# Patient Record
Sex: MALE | Race: WHITE | Employment: OTHER | URBAN - METROPOLITAN AREA
[De-identification: names, ages, dates, MRNs, and addresses within clinical notes are randomized per-mention and may not be internally consistent; named-entity substitution may affect disease eponyms.]

---

## 2017-03-24 PROBLEM — S83.281D TEAR OF LATERAL MENISCUS OF RIGHT KNEE, CURRENT, UNSPECIFIED TEAR TYPE, SUBSEQUENT ENCOUNTER: Status: ACTIVE | Noted: 2017-03-24

## 2017-03-24 PROBLEM — M22.41 CHONDROMALACIA PATELLAE, RIGHT: Status: ACTIVE | Noted: 2017-03-24

## 2017-03-24 PROBLEM — S83.241D TEAR OF MEDIAL MENISCUS OF RIGHT KNEE, CURRENT, UNSPECIFIED TEAR TYPE, SUBSEQUENT ENCOUNTER: Status: ACTIVE | Noted: 2017-03-24

## 2017-04-11 ENCOUNTER — EKG ENCOUNTER (OUTPATIENT)
Dept: LAB | Facility: HOSPITAL | Age: 72
End: 2017-04-11
Attending: ORTHOPAEDIC SURGERY
Payer: COMMERCIAL

## 2017-04-11 DIAGNOSIS — Z01.818 PRE-OP EXAM: Primary | ICD-10-CM

## 2017-04-11 DIAGNOSIS — Z01.812 PRE-OPERATIVE LABORATORY EXAMINATION: ICD-10-CM

## 2017-04-11 PROCEDURE — 36415 COLL VENOUS BLD VENIPUNCTURE: CPT

## 2017-04-11 PROCEDURE — 80053 COMPREHEN METABOLIC PANEL: CPT

## 2017-04-11 PROCEDURE — 85025 COMPLETE CBC W/AUTO DIFF WBC: CPT

## 2017-04-11 PROCEDURE — 93005 ELECTROCARDIOGRAM TRACING: CPT

## 2017-04-11 PROCEDURE — 93010 ELECTROCARDIOGRAM REPORT: CPT | Performed by: INTERNAL MEDICINE

## 2017-04-13 ENCOUNTER — HOSPITAL ENCOUNTER (OUTPATIENT)
Dept: LAB | Facility: HOSPITAL | Age: 72
Discharge: HOME | End: 2017-04-13
Attending: STUDENT IN AN ORGANIZED HEALTH CARE EDUCATION/TRAINING PROGRAM
Payer: COMMERCIAL

## 2017-04-13 DIAGNOSIS — R74.01 ELEVATED TRANSAMINASE LEVEL: ICD-10-CM

## 2017-04-13 PROCEDURE — 83550 IRON BINDING TEST: CPT | Performed by: STUDENT IN AN ORGANIZED HEALTH CARE EDUCATION/TRAINING PROGRAM

## 2017-04-13 PROCEDURE — 36415 COLL VENOUS BLD VENIPUNCTURE: CPT | Performed by: STUDENT IN AN ORGANIZED HEALTH CARE EDUCATION/TRAINING PROGRAM

## 2017-04-13 PROCEDURE — 85730 THROMBOPLASTIN TIME PARTIAL: CPT | Performed by: STUDENT IN AN ORGANIZED HEALTH CARE EDUCATION/TRAINING PROGRAM

## 2017-04-13 PROCEDURE — 82105 ALPHA-FETOPROTEIN SERUM: CPT | Performed by: STUDENT IN AN ORGANIZED HEALTH CARE EDUCATION/TRAINING PROGRAM

## 2017-04-13 PROCEDURE — 80074 ACUTE HEPATITIS PANEL: CPT | Performed by: STUDENT IN AN ORGANIZED HEALTH CARE EDUCATION/TRAINING PROGRAM

## 2017-04-13 PROCEDURE — 85610 PROTHROMBIN TIME: CPT | Performed by: STUDENT IN AN ORGANIZED HEALTH CARE EDUCATION/TRAINING PROGRAM

## 2017-04-13 PROCEDURE — 82728 ASSAY OF FERRITIN: CPT | Performed by: STUDENT IN AN ORGANIZED HEALTH CARE EDUCATION/TRAINING PROGRAM

## 2017-04-13 PROCEDURE — 83516 IMMUNOASSAY NONANTIBODY: CPT | Performed by: STUDENT IN AN ORGANIZED HEALTH CARE EDUCATION/TRAINING PROGRAM

## 2017-04-13 PROCEDURE — 84432 ASSAY OF THYROGLOBULIN: CPT | Performed by: STUDENT IN AN ORGANIZED HEALTH CARE EDUCATION/TRAINING PROGRAM

## 2017-04-13 PROCEDURE — 82550 ASSAY OF CK (CPK): CPT | Performed by: STUDENT IN AN ORGANIZED HEALTH CARE EDUCATION/TRAINING PROGRAM

## 2017-04-13 PROCEDURE — 83540 ASSAY OF IRON: CPT | Performed by: STUDENT IN AN ORGANIZED HEALTH CARE EDUCATION/TRAINING PROGRAM

## 2017-04-13 PROCEDURE — 80053 COMPREHEN METABOLIC PANEL: CPT | Performed by: STUDENT IN AN ORGANIZED HEALTH CARE EDUCATION/TRAINING PROGRAM

## 2017-04-13 PROCEDURE — 86038 ANTINUCLEAR ANTIBODIES: CPT | Performed by: STUDENT IN AN ORGANIZED HEALTH CARE EDUCATION/TRAINING PROGRAM

## 2017-04-13 PROCEDURE — 86708 HEPATITIS A ANTIBODY: CPT | Performed by: STUDENT IN AN ORGANIZED HEALTH CARE EDUCATION/TRAINING PROGRAM

## 2017-04-17 ENCOUNTER — HOSPITAL ENCOUNTER (OUTPATIENT)
Dept: ULTRASOUND IMAGING | Age: 72
Discharge: HOME | End: 2017-04-17
Attending: STUDENT IN AN ORGANIZED HEALTH CARE EDUCATION/TRAINING PROGRAM
Payer: COMMERCIAL

## 2017-04-17 DIAGNOSIS — R74.01 ELEVATED TRANSAMINASE LEVEL: ICD-10-CM

## 2017-04-17 PROCEDURE — 93975 VASCULAR STUDY: CPT

## 2017-04-17 PROCEDURE — 76700 US EXAM ABDOM COMPLETE: CPT

## 2017-04-19 ENCOUNTER — APPOINTMENT (OUTPATIENT)
Dept: LAB | Facility: HOSPITAL | Age: 72
End: 2017-04-19
Attending: STUDENT IN AN ORGANIZED HEALTH CARE EDUCATION/TRAINING PROGRAM
Payer: COMMERCIAL

## 2017-04-19 PROCEDURE — 80053 COMPREHEN METABOLIC PANEL: CPT | Performed by: STUDENT IN AN ORGANIZED HEALTH CARE EDUCATION/TRAINING PROGRAM

## 2017-04-19 PROCEDURE — 85610 PROTHROMBIN TIME: CPT | Performed by: STUDENT IN AN ORGANIZED HEALTH CARE EDUCATION/TRAINING PROGRAM

## 2017-04-21 ENCOUNTER — APPOINTMENT (OUTPATIENT)
Dept: LAB | Facility: HOSPITAL | Age: 72
End: 2017-04-21
Attending: STUDENT IN AN ORGANIZED HEALTH CARE EDUCATION/TRAINING PROGRAM
Payer: COMMERCIAL

## 2017-04-21 DIAGNOSIS — R74.01 NONSPECIFIC ELEVATION OF LEVELS OF TRANSAMINASE OR LACTIC ACID DEHYDROGENASE (LDH): ICD-10-CM

## 2017-04-21 DIAGNOSIS — R74.02 NONSPECIFIC ELEVATION OF LEVELS OF TRANSAMINASE OR LACTIC ACID DEHYDROGENASE (LDH): ICD-10-CM

## 2017-04-21 PROCEDURE — 85610 PROTHROMBIN TIME: CPT

## 2017-04-21 PROCEDURE — 36415 COLL VENOUS BLD VENIPUNCTURE: CPT

## 2017-04-21 PROCEDURE — 80053 COMPREHEN METABOLIC PANEL: CPT

## 2017-04-26 ENCOUNTER — APPOINTMENT (OUTPATIENT)
Dept: LAB | Facility: HOSPITAL | Age: 72
End: 2017-04-26
Attending: ORTHOPAEDIC SURGERY
Payer: COMMERCIAL

## 2017-04-26 DIAGNOSIS — Z01.812 PRE-OPERATIVE LABORATORY EXAMINATION: ICD-10-CM

## 2017-04-26 PROCEDURE — 84450 TRANSFERASE (AST) (SGOT): CPT

## 2017-04-26 PROCEDURE — 84460 ALANINE AMINO (ALT) (SGPT): CPT

## 2017-04-26 PROCEDURE — 36415 COLL VENOUS BLD VENIPUNCTURE: CPT

## 2017-05-03 ENCOUNTER — APPOINTMENT (OUTPATIENT)
Dept: LAB | Facility: HOSPITAL | Age: 72
End: 2017-05-03
Attending: ORTHOPAEDIC SURGERY
Payer: COMMERCIAL

## 2017-05-03 DIAGNOSIS — Z01.812 PRE-OPERATIVE LABORATORY EXAMINATION: ICD-10-CM

## 2017-05-03 PROCEDURE — 36415 COLL VENOUS BLD VENIPUNCTURE: CPT

## 2017-05-03 PROCEDURE — 80053 COMPREHEN METABOLIC PANEL: CPT

## 2017-05-04 PROBLEM — K71.9 DRUG-INDUCED LIVER INJURY: Status: ACTIVE | Noted: 2017-05-04

## 2017-05-10 ENCOUNTER — APPOINTMENT (OUTPATIENT)
Dept: LAB | Facility: HOSPITAL | Age: 72
End: 2017-05-10
Attending: STUDENT IN AN ORGANIZED HEALTH CARE EDUCATION/TRAINING PROGRAM
Payer: COMMERCIAL

## 2017-05-10 DIAGNOSIS — IMO0002 TRANSAMINASE OR LDH ELEVATION: ICD-10-CM

## 2017-05-10 PROCEDURE — 36415 COLL VENOUS BLD VENIPUNCTURE: CPT

## 2017-05-10 PROCEDURE — 80053 COMPREHEN METABOLIC PANEL: CPT

## 2017-06-02 PROBLEM — S83.281A ACUTE LATERAL MENISCUS TEAR OF RIGHT KNEE: Status: ACTIVE | Noted: 2017-06-02

## 2017-06-02 PROBLEM — M17.11 PRIMARY OSTEOARTHRITIS OF RIGHT KNEE: Status: ACTIVE | Noted: 2017-06-02

## 2017-06-02 PROBLEM — S83.241A ACUTE MEDIAL MENISCUS TEAR OF RIGHT KNEE: Status: ACTIVE | Noted: 2017-06-02

## 2021-03-31 ENCOUNTER — OFFICE VISIT (OUTPATIENT)
Dept: NEUROLOGY | Facility: CLINIC | Age: 76
End: 2021-03-31
Payer: MEDICARE

## 2021-03-31 VITALS
DIASTOLIC BLOOD PRESSURE: 72 MMHG | HEIGHT: 75 IN | HEART RATE: 65 BPM | BODY MASS INDEX: 33.69 KG/M2 | WEIGHT: 271 LBS | RESPIRATION RATE: 20 BRPM | SYSTOLIC BLOOD PRESSURE: 120 MMHG

## 2021-03-31 DIAGNOSIS — G25.0 ESSENTIAL TREMOR: Primary | ICD-10-CM

## 2021-03-31 PROCEDURE — 3008F BODY MASS INDEX DOCD: CPT | Performed by: OTHER

## 2021-03-31 PROCEDURE — 99204 OFFICE O/P NEW MOD 45 MIN: CPT | Performed by: OTHER

## 2021-03-31 PROCEDURE — 3078F DIAST BP <80 MM HG: CPT | Performed by: OTHER

## 2021-03-31 PROCEDURE — 3074F SYST BP LT 130 MM HG: CPT | Performed by: OTHER

## 2021-03-31 RX ORDER — ASCORBIC ACID 500 MG
TABLET ORAL
COMMUNITY

## 2021-03-31 NOTE — PROGRESS NOTES
[facility]   Neurology; INITIAL CLINIC VISIT  3/31/2021, 9:11 AM     Venkatesh Del Rio Patient Status:  No patient class for patient encounter    1945 MRN [id number]   Location [address] [facility] Attending No att.  pr of systems was completed. Pertinent positives and negatives noted in the the HPI.       PHYSICAL EXAMINATION:  /72   Pulse 65   Resp 20   Ht 75\"   Wt 271 lb (122.9 kg)   BMI 33.87 kg/m²   Looks stated age, PC AS  No LAD, no TM  Lungs CTA  Heart S1S2

## 2022-03-23 LAB
AMB EXT ANION GAP: 5
AMB EXT BUN: 17 MG/DL
AMB EXT CALCIUM: 8.8
AMB EXT CARBON DIOXIDE: 29
AMB EXT CHLORIDE: 29
AMB EXT CHOLESTEROL, TOTAL: 129 MG/DL
AMB EXT CMP ALT: 20 U/L
AMB EXT CMP AST: 19 U/L
AMB EXT CREATININE: 0.89 MG/DL
AMB EXT EGFR NON-AA: 88
AMB EXT GLUCOSE: 94 MG/DL
AMB EXT HDL CHOLESTEROL: 44 MG/DL
AMB EXT HGBA1C: 6.7 %
AMB EXT LDL CHOLESTEROL, DIRECT: 72 MG/DL
AMB EXT NON HDL CHOL: 85 MG/DL
AMB EXT POSTASSIUM: 4.4 MMOL/L
AMB EXT SODIUM: 1140 MMOL/L
AMB EXT TOTAL PROTEIN: 6.8
AMB EXT TRIGLYCERIDES: 65 MG/DL

## 2022-05-02 ENCOUNTER — HOSPITAL ENCOUNTER (OUTPATIENT)
Dept: ULTRASOUND IMAGING | Age: 77
Discharge: HOME | End: 2022-05-02
Attending: NURSE PRACTITIONER
Payer: MEDICARE

## 2022-05-02 DIAGNOSIS — Z13.6 SCREENING FOR CARDIOVASCULAR CONDITION: ICD-10-CM

## 2022-05-02 PROCEDURE — 76706 US ABDL AORTA SCREEN AAA: CPT | Performed by: NURSE PRACTITIONER

## 2022-05-06 ENCOUNTER — NURSE ONLY (OUTPATIENT)
Dept: ENDOCRINOLOGY CLINIC | Facility: CLINIC | Age: 77
End: 2022-05-06
Payer: MEDICARE

## 2022-05-06 ENCOUNTER — TELEPHONE (OUTPATIENT)
Dept: ENDOCRINOLOGY CLINIC | Facility: CLINIC | Age: 77
End: 2022-05-06

## 2022-05-06 VITALS — BODY MASS INDEX: 35 KG/M2 | WEIGHT: 280.63 LBS

## 2022-05-06 DIAGNOSIS — E11.9 DIABETES MELLITUS WITHOUT COMPLICATION (HCC): Primary | ICD-10-CM

## 2022-05-08 RX ORDER — BIOTIN 2500 MCG
2 CAPSULE ORAL DAILY
COMMUNITY

## 2022-05-23 ENCOUNTER — DIABETIC EDUCATION (OUTPATIENT)
Dept: ENDOCRINOLOGY CLINIC | Facility: CLINIC | Age: 77
End: 2022-05-23
Payer: MEDICARE

## 2022-05-23 DIAGNOSIS — E11.9 TYPE 2 DIABETES MELLITUS WITHOUT COMPLICATION, WITHOUT LONG-TERM CURRENT USE OF INSULIN (HCC): Primary | ICD-10-CM

## 2022-05-23 NOTE — PROGRESS NOTES
Francia Dotson   9/4/1945 was seen for Medical Nutrition Therapy with Diabetes:    5/23/2022  Referring Provider: Dr. Jen Christine  Start time: 3:30 End time: 4:30    HgbA1C (%)   Date Value   03/23/2022 6.7     Here with wife John Hodges. Is testing BG's at varying times of the day ranging . Weight down 5# in 2 weeks. Had gained weight in the past year. B: smoothie or a banana or a slice of toast and half glass tomato juice    L: 1/2 sandwich and 1/2 tomato and 1/2 cucumber    2-3pm apple, pear or orange or maybe yogurt    D: chicken, salad, vegetable, smaller portion of pasta than usual.     HS: maybe small portion of skinny pop popcorn    Beverages: mostly water, occasionally diet or regular soda or wine. One piece salted caramel or ice cream (smaller portion). Exercise: rowing machine 3 times/week from 20-30 minutes 400 strokes. Today he mowed two lawns. Reviewed HgbA1C and target A1c levels. Reviewed target BG levels FBS  and pp <150 ideally. Reviewed Type 2 diabetes as a diagnosis. Discussed insulin resistance and tools to treat: diet, regular physical activity, diabetes medications, and stress management. Also discussed natural progression of Type 2 diabetes and ways to slow progression: regular physical activity, good blood glucose control and avoid weight gain/possibly reduce weight. Taught label reading: focus on counting grams of carb rather than looking at sugar. Reviewed current diabetes medications: didn't tolerate metformin ER. Discussed Rule of 15 for treating hypoglycemia. Discussed macronutrient balance: reduce starch, include lean protein and non-starchy vegetables, also fruit, yogurt and milk using food models. Gave examples of heart healthy, balanced meals with 30-45 grams of carbohydrate/meal.     Reviewed principles for heart healthy diet: reduced saturated fat, reduced sodium and high fiber.     Exercise: Discussed benefits of regular physical activity and goal to complete 30 minutes daily. Patient set diabetes self-management goals: keep up the good work, maybe increase food intake slightly (although pt states he is not hungry). Patient is willing to make lifestyle changes to improve blood glucose control. Written materials provided for all topics covered. Patient verbalized understanding and has no further questions at this time. Thank you for the referral.  Follow-up plan: pt scheduled to attend classes starting in June on Thursdays. Patient to contact diabetes center for questions/concerns.   Roma Roy MS, RD, CDCES, LDN

## 2022-06-09 ENCOUNTER — NURSE ONLY (OUTPATIENT)
Dept: ENDOCRINOLOGY CLINIC | Facility: CLINIC | Age: 77
End: 2022-06-09
Payer: MEDICARE

## 2022-06-09 DIAGNOSIS — E11.9 TYPE 2 DIABETES MELLITUS WITHOUT COMPLICATION, WITHOUT LONG-TERM CURRENT USE OF INSULIN (HCC): Primary | ICD-10-CM

## 2022-06-09 NOTE — PROGRESS NOTES
Francia Dotson  [id number] attended Step 2 Group Class: Pathophysiology of Diabetes, Types of Diabetes, Sources of Carbohydrate, Blood Glucose Targets, Medications    Date: 6/9/2022  Referring Provider: Dr. Huan Bingham  Start time: 2pm End time: 3:30pm    The patient participated during the class: The patient was able to answer questions appropriately about treatments for type 2 diabetes. Healthy Eating   Identify sources of carbohydrate  Review technology options for assistance in determining carbohydrate and caloric content of foods     Being Active   Introduce the benefits and precautions of regular physical activity    Monitoring   Review blood glucose targets, Hemoglobin A1C   Introduce Continuous Glucose Monitoring Systems    Taking Medication   Discuss types of diabetes medications: methods of action, positive and negative aspects of each one, side effects including insulin     Problem Solving   Discuss risk factors for type 2 diabetes  Identify habits, myths and behavior changes affecting eating patterns and choices  Discuss how tracking calories and carbohydrate intake affects blood glucose and weigh reduction    Reducing Risks    Discuss progress with goals initiated in Step 1 individual session  Discuss signs/symptoms and treatment of hypoglycemia    Healthy Coping  During introductions, discuss how having diabetes and getting the diagnosis affects individuals with diabetes    The patient verbalized understanding and has no further questions at this time. Written materials provided for all areas covered.   Recommendation: attend Step 3 Class    Sherrell Novak RN, CDE

## 2022-06-16 ENCOUNTER — NURSE ONLY (OUTPATIENT)
Dept: ENDOCRINOLOGY CLINIC | Facility: CLINIC | Age: 77
End: 2022-06-16
Payer: MEDICARE

## 2022-06-16 DIAGNOSIS — E11.9 DIABETES MELLITUS WITHOUT COMPLICATION (HCC): Primary | ICD-10-CM

## 2022-06-16 PROCEDURE — G0109 DIAB MANAGE TRN IND/GROUP: HCPCS | Performed by: DIETITIAN, REGISTERED

## 2022-06-16 NOTE — PROGRESS NOTES
Denise Camp  [id number] attended Step 3 Group Class: Carbohydrate Counting, Treating Lows    Date: 6/16/2022  Referring Provider: Dr. Ely Luis  Start time: 2pmEnd time: 3:30pm    The patient participated during the class: Pt was able to identify sources of carbohydrates. Healthy Eating  Introduce the balanced plate  Learn to count carbohydrates as well as to differentiate the food groups  Discuss label reading  Discuss sugar substitutes and effects on blood glucose  Discuss alcohol and effects on blood glucose  Review technology options for assistance in determining carbohydrate and caloric content of foods    Being Active  Discuss benefits of exercise and common myths    Monitoring  Review blood glucose targets    Taking Medication  Discuss treatment options for type 2 diabetes, introduce progressive nature of type 2 diabetes   Reducing Risks  Discuss signs/symptoms and treatment of hypoglycemia    The patient verbalized understanding and has no further questions at this time  Written materials provided for all areas covered. Recommendation: attend individual MNT session and Step 4 Class  Patient verbalized understanding and has no further questions at this time.     Annette Arzate, RN, CDE

## 2022-06-30 ENCOUNTER — NURSE ONLY (OUTPATIENT)
Dept: ENDOCRINOLOGY CLINIC | Facility: CLINIC | Age: 77
End: 2022-06-30
Payer: MEDICARE

## 2022-06-30 DIAGNOSIS — E11.9 TYPE 2 DIABETES MELLITUS WITHOUT COMPLICATION, WITHOUT LONG-TERM CURRENT USE OF INSULIN (HCC): Primary | ICD-10-CM

## 2022-06-30 PROCEDURE — G0109 DIAB MANAGE TRN IND/GROUP: HCPCS | Performed by: DIETITIAN, REGISTERED

## 2022-06-30 NOTE — PROGRESS NOTES
Iveth Burton  [id number] attended Step 4 Group Class: Reducing Complications, Special Occasion Eating, Treating Hyperglycemia  Date: 6/30/2022  Referring Provider: Dr. Vijay Rust  Start time: 2pm End time: 3:30pm    The patient participated during the class: Patient was able to identify ways to reduce risks for micro and macro-vascular complications of Type 2 Diabetes. Being Active  Reinforce the importance of regular physical activity    Monitoring  Discuss/answer questions about blood glucose trends    Taking Medication   Reinforce the importance of taking diabetes medications as prescribed    Problem Solving  Discuss disaster preparedness and planning to travel safely with type 2 diabetes    Reducing Risks  Discuss potential complications of uncontrolled diabetes and how to minimize risk including eye, foot, dental, and skin care. Renal and cardiovascular monitoring discussed including target goals and signs/symptoms of MI and CVA. Discuss immunizations recommended for type 2 diabetes  Discuss sick day management, DKA (diabetic ketoacidosis) and HHNS (hyperosmolar, hyperglycemic non-ketotic syndrome)   Healthy Coping  Discuss support plan, stress reduction and diabetes distress    The patient verbalized understanding and has no further questions at this time  Written materials provided for all areas covered.   Recommendation: attend Step 5 Class    Camelia Pitt, RN, CDE

## 2022-07-07 ENCOUNTER — NURSE ONLY (OUTPATIENT)
Dept: ENDOCRINOLOGY CLINIC | Facility: CLINIC | Age: 77
End: 2022-07-07
Payer: MEDICARE

## 2022-07-07 DIAGNOSIS — E11.9 TYPE 2 DIABETES MELLITUS WITHOUT COMPLICATION, WITHOUT LONG-TERM CURRENT USE OF INSULIN (HCC): Primary | ICD-10-CM

## 2022-07-07 PROCEDURE — G0109 DIAB MANAGE TRN IND/GROUP: HCPCS | Performed by: DIETITIAN, REGISTERED

## 2022-07-07 NOTE — PROGRESS NOTES
Delvin Paz  [id number] attended Step 5 Group Class: Heart Healthy Diet, Exercise, Stress Management  Date: 7/7/2022  Referring Provider: Dr. Hannah Hatch  Start time: 2:00 End time: 3:30    The patient and wife Keshia Tarango attended class in person in [place]. The patient participated during the class: He has lost 13# since early May. He tests his blood glucose once/day: FBS's average 110. Sometimes tests at HS -  max. He does 500 pulls on the rowing machine (30 minutes) 3 days/week plus walks and mowing the yard. Sees pcp 7/15 and will have A1C tested again (was 6.7% 3/23/22 and 5.9% 3/21. Is not on any BG meds. Healthy Eating  Discuss heart healthy diet (fat, cholesterol, fiber and sodium)   Mediterranean diet including cooking methods  Discuss healthy snacks  Discuss challenges at the grocery store and options     Being Active  Discuss benefits and precautions of regular physical activity    Monitoring  Discuss/answer questions about blood glucose trends    Taking Medication  Discuss/answer questions about taking diabetes medications    Problem Solving  Review progress on overall goals to-date    Healthy Coping  Review stress management, support plan and diabetes distress    The patient verbalized understanding and has no further questions at this time  Written materials provided for all areas covered. Recommendation: attend Step 6 Class in 6 weeks (scheduled for 9/1/22).    Rolanda Doll MS RD KATHERINN CARLEY

## 2022-09-01 ENCOUNTER — NURSE ONLY (OUTPATIENT)
Dept: ENDOCRINOLOGY CLINIC | Facility: CLINIC | Age: 77
End: 2022-09-01
Payer: MEDICARE

## 2022-09-01 DIAGNOSIS — E11.9 DIABETES MELLITUS WITHOUT COMPLICATION (HCC): Primary | ICD-10-CM

## 2022-09-22 PROBLEM — D12.2 BENIGN NEOPLASM OF ASCENDING COLON: Status: ACTIVE | Noted: 2022-09-22

## 2022-09-22 PROBLEM — D12.3 BENIGN NEOPLASM OF TRANSVERSE COLON: Status: ACTIVE | Noted: 2022-09-22

## 2022-09-22 PROBLEM — Z86.010 HISTORY OF ADENOMATOUS POLYP OF COLON: Status: ACTIVE | Noted: 2022-09-22

## 2022-09-22 PROBLEM — Z86.0101 HISTORY OF ADENOMATOUS POLYP OF COLON: Status: ACTIVE | Noted: 2022-09-22

## 2022-09-22 PROCEDURE — 88305 TISSUE EXAM BY PATHOLOGIST: CPT | Performed by: INTERNAL MEDICINE

## 2023-03-09 ENCOUNTER — TELEPHONE (OUTPATIENT)
Dept: ENDOCRINOLOGY CLINIC | Facility: CLINIC | Age: 78
End: 2023-03-09

## 2023-03-09 ENCOUNTER — NURSE ONLY (OUTPATIENT)
Dept: ENDOCRINOLOGY CLINIC | Facility: CLINIC | Age: 78
End: 2023-03-09
Payer: MEDICARE

## 2023-03-09 DIAGNOSIS — E11.9 DIABETES MELLITUS WITHOUT COMPLICATION (HCC): Primary | ICD-10-CM

## 2023-03-09 RX ORDER — BLOOD SUGAR DIAGNOSTIC
STRIP MISCELLANEOUS
Qty: 100 STRIP | Refills: 3 | Status: SHIPPED | OUTPATIENT
Start: 2023-03-09 | End: 2024-03-08

## 2023-03-09 RX ORDER — LANCETS
EACH MISCELLANEOUS
Qty: 100 EACH | Refills: 0 | Status: SHIPPED | OUTPATIENT
Start: 2023-03-09

## 2023-03-10 NOTE — PROGRESS NOTES
Christopher Valenzuela : 1945 was seen for Diabetic Education Follow up:    Date: 3/9/23 Referring Provider: Dr. Francy Santo  Start time: 9am End time: 9:30am    Assessment:     Diagnosis: Type 2 DM    Reason for visit: 6 month follow-up    Changes since last visit:  - Meals:hasn't been as conscious about his diet  - Medications:none  - Exercise:using rowing machine in am 3x/wk. 600-750 pulls  - Blister between 2 toes- middle left    SMBG:  Fastin-126 mg/dL    Education:     DIABETES REVIEW:  - Importance of improved BG control in preventing complications    HEALTHY EATING:  - effect of food on BG, timing of meal, use of snacks/fiber, barriers: has been drinking 2 glasses of wine w/dinner daily & ice cream for HS snack    BEING ACTIVE:  - types of activity, frequency , duration: uses rowing machine 3x/wk    MONITORING:  - Type of meter: Accu-chek Guide  - Testing Schedule: Fasting only     TAKING MEDICATION: None    PROBLEM SOLVING:  Unable to determine patterns;checking 1-2x/wk & fasting only    REDUCING RISKS:  - relationship between glucose control and risk for complications in eye, heart, kidneys,nerves,teeth,foot care, skin,  Foot Care Guidelines    Recommendations:      1. Follow recommended meal plan. 2. Test BG fasting , 2 hours post meals once daily    3. Bring glucose logs to all provider visits for review. 4. Continue current rowing exercise but add 10-15 min of walking after dinner   5. Encouraged  to call diabetes center with any questions or concerns. 6. Follow-up in 6 months   &. Encouraged to schedule visit with Podiatrist    Script sent to preferred pharmacy for glucose test strips and lancets per protocol. Patient verbalized understanding and has no further questions at this time.     Chris Rinaldi RN, [facility]

## 2024-08-28 ENCOUNTER — HOSPITAL ENCOUNTER (OUTPATIENT)
Dept: MRI IMAGING | Facility: HOSPITAL | Age: 79
Discharge: HOME | End: 2024-08-28
Attending: PHYSICIAN ASSISTANT
Payer: MEDICARE

## 2024-08-28 DIAGNOSIS — S83.8X1A SPRAIN OF OTHER LIGAMENT OF RIGHT KNEE, INITIAL ENCOUNTER: ICD-10-CM

## 2024-08-28 DIAGNOSIS — M23.51 RECURRENT RIGHT KNEE INSTABILITY: ICD-10-CM

## 2024-08-28 PROCEDURE — 73721 MRI JNT OF LWR EXTRE W/O DYE: CPT | Performed by: PHYSICIAN ASSISTANT

## 2024-09-04 ENCOUNTER — TELEPHONE (OUTPATIENT)
Dept: ORTHOPEDICS CLINIC | Facility: CLINIC | Age: 79
End: 2024-09-04

## 2024-09-04 NOTE — TELEPHONE ENCOUNTER
Advised patient if imaging disk with xrays of the knee is available to bring to his appointment, no recent xrays of the right knee in EPIC

## 2024-09-04 NOTE — TELEPHONE ENCOUNTER
Future Appointments   Date Time Provider Department Center   9/11/2024  3:20 PM [id number]     Please advise if patient needs right knee xrays.

## 2024-09-11 ENCOUNTER — HOSPITAL ENCOUNTER (OUTPATIENT)
Dept: GENERAL RADIOLOGY | Age: 79
Discharge: HOME | End: 2024-09-11
Attending: ORTHOPAEDIC SURGERY
Payer: MEDICARE

## 2024-09-11 ENCOUNTER — OFFICE VISIT (OUTPATIENT)
Dept: ORTHOPEDICS CLINIC | Facility: CLINIC | Age: 79
End: 2024-09-11
Payer: MEDICARE

## 2024-09-11 VITALS — HEIGHT: 75 IN | BODY MASS INDEX: 34.82 KG/M2 | WEIGHT: 280 LBS

## 2024-09-11 DIAGNOSIS — M17.11 PRIMARY OSTEOARTHRITIS OF RIGHT KNEE: ICD-10-CM

## 2024-09-11 DIAGNOSIS — S83.241D TEAR OF MEDIAL MENISCUS OF RIGHT KNEE, CURRENT, UNSPECIFIED TEAR TYPE, SUBSEQUENT ENCOUNTER: Primary | ICD-10-CM

## 2024-09-11 DIAGNOSIS — Z01.89 ENCOUNTER FOR LOWER EXTREMITY COMPARISON IMAGING STUDY: ICD-10-CM

## 2024-09-11 DIAGNOSIS — M25.561 RIGHT KNEE PAIN, UNSPECIFIED CHRONICITY: ICD-10-CM

## 2024-09-11 DIAGNOSIS — S83.271A COMPLEX TEAR OF LATERAL MENISCUS OF RIGHT KNEE AS CURRENT INJURY, INITIAL ENCOUNTER: ICD-10-CM

## 2024-09-11 PROCEDURE — 3008F BODY MASS INDEX DOCD: CPT | Performed by: ORTHOPAEDIC SURGERY

## 2024-09-11 PROCEDURE — 1160F RVW MEDS BY RX/DR IN RCRD: CPT | Performed by: ORTHOPAEDIC SURGERY

## 2024-09-11 PROCEDURE — 73564 X-RAY EXAM KNEE 4 OR MORE: CPT | Performed by: ORTHOPAEDIC SURGERY

## 2024-09-11 PROCEDURE — 1159F MED LIST DOCD IN RCRD: CPT | Performed by: ORTHOPAEDIC SURGERY

## 2024-09-11 PROCEDURE — 99204 OFFICE O/P NEW MOD 45 MIN: CPT | Performed by: ORTHOPAEDIC SURGERY

## 2024-09-11 PROCEDURE — 73562 X-RAY EXAM OF KNEE 3: CPT | Performed by: ORTHOPAEDIC SURGERY

## 2024-09-11 PROCEDURE — 1125F AMNT PAIN NOTED PAIN PRSNT: CPT | Performed by: ORTHOPAEDIC SURGERY

## 2024-09-11 NOTE — PROGRESS NOTES
[facility] New Consult      Chief Complaint   Patient presents with    Knee Pain     RT KNEE PAIN;ONSET: 2-3 months ago  -denies any falls or injuries  -previous surgery from torn meniscus 7 years ago       HPI: The patient is a 79 year old male referred for orthopaedic consultation with a 2 to 3-month history of right knee pain and subjective weakness.  No injury is reported although there is a history of arthroscopy for meniscus injuries performed by Dr. Tom Arrieta approximately 8 years ago.  Overall his symptoms have begun to subside with only mild intermittent ache and occasional pains when transitioning from a seated position.  He denies catching, locking, instability or distal radiation.  X-rays and MRI are available for our review.      Past Medical History:    History of arthroscopic knee surgery    History of vasectomy    Kidney stones    Mild asthma (HCC)    Shingles    50 years ago    Thalassemia minor    diagnosed in 20s     Past Surgical History:   Procedure Laterality Date    Colonoscopy      Knee surgery      arthroscopy meniscus repair, right knee 2016    Sigmoidoscopy,diagnostic       Current Outpatient Medications   Medication Sig Dispense Refill    Accu-Chek Softclix Lancets Does not apply Misc Check blood sugar once daily  Dx code: E11.9 Non-insulin dependent diabetes 100 each 0    PEG 3350-KCl-Na Bicarb-NaCl 420 g Oral Recon Soln Take 4,000 mL by mouth As Directed. 4000 mL 0    Biotin 2500 MCG Oral Cap Take 2 capsules by mouth daily.      ascorbic acid 500 MG Oral Tab 1 tablet Orally Once a day      Ergocalciferol (VITAMIN D OR) Take by mouth.      Atorvastatin Calcium 20 MG Oral Tab   11    ADVAIR DISKUS 250-50 MCG/DOSE Inhalation Aerosol Powder, Breath Activated   2    Montelukast Sodium 10 MG Oral Tab   2    Glucose Blood (ACCU-CHEK GUIDE) In Vitro Strip Check once daily Dx Code: E11.9 Non-insulin dependent diabetes 100 strip 3    Lidocaine 4 % External Cream Apply 1  Application topically as needed. (Patient not taking: Reported on 9/8/2022) 45 g 1     No Known Allergies  Family History   Problem Relation Age of Onset    Cancer Father     Other (thalassemia minor) Father     Other (thalassemia minor) Son     Breast Cancer Sister      Social History     Occupational History    Not on file   Tobacco Use    Smoking status: Former     Current packs/day: 1.00     Average packs/day: 1 pack/day for 5.0 years (5.0 ttl pk-yrs)     Types: Cigarettes    Smokeless tobacco: Never    Tobacco comments:     Quit in 1976   Vaping Use    Vaping status: Never Used   Substance and Sexual Activity    Alcohol use: Yes     Alcohol/week: 7.0 standard drinks of alcohol     Types: 5 Glasses of wine, 2 Cans of beer per week     Comment: glass of wine, 4-5 times a week    Drug use: No    Sexual activity: Yes     Partners: Female     Birth control/protection: Vasectomy        ROS:  Complete ROS reviewed by me and non-contributory to the chief complaint except as mentioned above.    Physical Exam:    Ht 6' 3\" (1.905 m)   Wt 280 lb (127 kg)   BMI 35.00 kg/m²   Constitutional: Well developed, well nourished 79 year old male  Psychological: NAD, alert and appropriate  Respiratory: Breathing comfortably on room air with RR of 10-14  Cardiac: Palpable distal pulses with pink warm extremities distally  Right knee:  Inspection reveals no significant discoloration or deformity.  Palpation reveals no significant effusion.  Range of motion is adequate with trace flexion contracture and adequate flexion to at least 115 degrees.  Posterior medial and posterior lateral joint lines are minimally tender to palpation.  Collaterals are stable on varus valgus stress.  Lachman and posterior drawer are negative.  Popliteal space is nontender.  No significant distal edema is noted.  Neurovascular status is intact on sensory, motor and perfusion assessment distally.      Imaging:    Multiple views right knee demonstrates  relative narrowing at the patellofemoral joint with preserved tibiofemoral articulations.  This is primarily at the medial facet both knees.    MRI right knee from 8/29/2024 reveals complex degenerative tearing at the lateral meniscus with horizontal oblique tearing at the mid body and posterior horn of the medial meniscus.       MRI KNEE, RIGHT ([id number])    Result Date: 8/29/2024  CONCLUSION:  1. Complex tears involving the entire lateral meniscus 2. Horizontal oblique tear of the body and posterior horn of the medial meniscus with superimposed radial tear in the posterior horn 3. Findings concerning for fat pad impingement. 4. Small suprapatellar joint effusion with findings concerning for intra-articular bodies.   LOCATION:  [facility]        Dictated by (CST): Clint Jennings MD on 8/29/2024 at 9:55 AM     Finalized by (CST): Clint Jennings MD on 8/29/2024 at 10:09 AM          Assessment/Diagnoses:  Diagnoses and all orders for this visit:    Tear of medial meniscus of right knee, current, unspecified tear type, subsequent encounter    Complex tear of lateral meniscus of right knee as current injury, initial encounter    Primary osteoarthritis of right knee      Plan:  I reviewed imaging and exam findings with the patient and his wife.  The diagnosis is degenerative joint disease of the right knee primarily at the patellofemoral joint.  He also has degenerative tearing at the menisci found on a recent MRI which may also include some postsurgical change from his arthroscopy performed years ago.  We discussed the etiology, natural history and treatment options in detail.  Treatments include activity modification, weight loss, anti-inflammatory use and possible injections.  In the long term, the patient's symptoms may progress and warrant consideration of repeat knee arthroscopy, but only if symptoms become severe.  For now, non-surgical treatment options are recommended.  We discussed the option for knee  corticosteroid injection along with the potential risks, benefits and alternatives.  In light of progressive improvement in his symptoms, the patient elects to hold off for now.  If he develops progressive pains, mechanical catching, severe swelling or localizing tibiofemoral symptoms, arthroscopy may be considered.  All questions were answered and the patient and his wife verbalized understanding and appreciation.  Follow-up with us as needed.      Ju Antonio MD, [facility]  Orthopaedic Surgery   Sports Medicine/Knee and Shoulder  [facility]  t: [phone]  f: [phone]             This document was partially prepared using Dragon Medical voice recognition software.  Although every attempt is made to correct errors during dictation, discrepancies may still exist.

## 2024-12-06 ENCOUNTER — HOSPITAL ENCOUNTER (OUTPATIENT)
Dept: CT IMAGING | Facility: HOSPITAL | Age: 79
Discharge: HOME | End: 2024-12-06
Attending: PHYSICIAN ASSISTANT
Payer: MEDICARE

## 2024-12-06 DIAGNOSIS — N20.0 NEPHROLITHIASIS: ICD-10-CM

## 2024-12-06 PROCEDURE — 74176 CT ABD & PELVIS W/O CONTRAST: CPT | Performed by: PHYSICIAN ASSISTANT
